# Patient Record
Sex: FEMALE | Race: WHITE
[De-identification: names, ages, dates, MRNs, and addresses within clinical notes are randomized per-mention and may not be internally consistent; named-entity substitution may affect disease eponyms.]

---

## 2020-02-20 ENCOUNTER — HOSPITAL ENCOUNTER (OUTPATIENT)
Dept: HOSPITAL 19 - LDRO | Age: 30
Discharge: HOME | End: 2020-02-20
Payer: OTHER GOVERNMENT

## 2020-02-20 VITALS — HEIGHT: 64.02 IN | WEIGHT: 145.28 LBS | BODY MASS INDEX: 24.8 KG/M2

## 2020-02-20 VITALS — DIASTOLIC BLOOD PRESSURE: 80 MMHG | SYSTOLIC BLOOD PRESSURE: 120 MMHG | HEART RATE: 107 BPM | TEMPERATURE: 98.6 F

## 2020-02-20 VITALS — SYSTOLIC BLOOD PRESSURE: 120 MMHG | TEMPERATURE: 98.6 F | DIASTOLIC BLOOD PRESSURE: 80 MMHG | HEART RATE: 107 BPM

## 2020-02-20 DIAGNOSIS — O26.893: ICD-10-CM

## 2020-02-20 DIAGNOSIS — Z3A.37: ICD-10-CM

## 2020-02-20 DIAGNOSIS — R25.2: ICD-10-CM

## 2020-02-20 NOTE — NUR
SVE unchanged. Reactive, cat 1 FHR strip noted. Patient taken off EFM and
given discharge orders. Reviewed labor precautions and kick counts, patient
denies questions. leaves ambulatory at 1640.

## 2020-02-20 NOTE — NUR
Patient arrives ambulatory with spouse with complaints of irregular cramping
and contractions since 0300 this morning. Patient denies ROM or vaginal
bleeding and reports normal fetal movement.
Changes into gown, EFM explained and placed. VS obtained.
 
1533- SVE by ES De Santiago RN 1/50/-3, unchanged from last office exam. Patient
repositioned and updated on plan of care. Assessment completed.
 
1550- Dr. Goodpasture notified via office RN by ES De Santiago RN. Reviewed
patient complaint, reviewed history and assessment. Reviewed SVE, FHR strip
and contraction pattern. Orders to monitor over one hour and discharge home if
unchanged at that time.

## 2020-03-04 ENCOUNTER — HOSPITAL ENCOUNTER (INPATIENT)
Dept: HOSPITAL 19 - LDR | Age: 30
LOS: 2 days | Discharge: HOME | End: 2020-03-06
Payer: OTHER GOVERNMENT

## 2020-03-04 VITALS — BODY MASS INDEX: 25.33 KG/M2 | HEIGHT: 64.02 IN | WEIGHT: 148.37 LBS

## 2020-03-04 DIAGNOSIS — Z3A.39: ICD-10-CM

## 2020-03-05 VITALS — DIASTOLIC BLOOD PRESSURE: 69 MMHG | SYSTOLIC BLOOD PRESSURE: 111 MMHG | TEMPERATURE: 97.9 F | HEART RATE: 112 BPM

## 2020-03-05 VITALS — SYSTOLIC BLOOD PRESSURE: 105 MMHG | DIASTOLIC BLOOD PRESSURE: 57 MMHG | HEART RATE: 96 BPM

## 2020-03-05 VITALS — SYSTOLIC BLOOD PRESSURE: 110 MMHG | DIASTOLIC BLOOD PRESSURE: 79 MMHG | HEART RATE: 104 BPM

## 2020-03-05 VITALS — SYSTOLIC BLOOD PRESSURE: 116 MMHG | DIASTOLIC BLOOD PRESSURE: 75 MMHG | HEART RATE: 105 BPM | TEMPERATURE: 98.9 F

## 2020-03-05 VITALS — SYSTOLIC BLOOD PRESSURE: 114 MMHG | HEART RATE: 106 BPM | DIASTOLIC BLOOD PRESSURE: 58 MMHG

## 2020-03-05 VITALS — HEART RATE: 106 BPM | SYSTOLIC BLOOD PRESSURE: 112 MMHG | DIASTOLIC BLOOD PRESSURE: 69 MMHG

## 2020-03-05 VITALS — DIASTOLIC BLOOD PRESSURE: 60 MMHG | HEART RATE: 95 BPM | SYSTOLIC BLOOD PRESSURE: 102 MMHG

## 2020-03-05 VITALS — SYSTOLIC BLOOD PRESSURE: 110 MMHG | DIASTOLIC BLOOD PRESSURE: 67 MMHG | HEART RATE: 63 BPM

## 2020-03-05 VITALS — DIASTOLIC BLOOD PRESSURE: 64 MMHG | HEART RATE: 99 BPM | SYSTOLIC BLOOD PRESSURE: 108 MMHG

## 2020-03-05 VITALS — HEART RATE: 88 BPM | SYSTOLIC BLOOD PRESSURE: 109 MMHG | DIASTOLIC BLOOD PRESSURE: 61 MMHG

## 2020-03-05 VITALS — HEART RATE: 102 BPM | SYSTOLIC BLOOD PRESSURE: 103 MMHG | DIASTOLIC BLOOD PRESSURE: 76 MMHG

## 2020-03-05 VITALS — SYSTOLIC BLOOD PRESSURE: 110 MMHG | HEART RATE: 104 BPM | DIASTOLIC BLOOD PRESSURE: 79 MMHG

## 2020-03-05 VITALS — SYSTOLIC BLOOD PRESSURE: 109 MMHG | HEART RATE: 93 BPM | DIASTOLIC BLOOD PRESSURE: 72 MMHG

## 2020-03-05 VITALS — TEMPERATURE: 98.3 F

## 2020-03-05 VITALS — SYSTOLIC BLOOD PRESSURE: 112 MMHG | HEART RATE: 113 BPM | DIASTOLIC BLOOD PRESSURE: 62 MMHG

## 2020-03-05 VITALS — DIASTOLIC BLOOD PRESSURE: 64 MMHG | SYSTOLIC BLOOD PRESSURE: 109 MMHG | HEART RATE: 105 BPM

## 2020-03-05 VITALS — HEART RATE: 100 BPM | DIASTOLIC BLOOD PRESSURE: 65 MMHG | SYSTOLIC BLOOD PRESSURE: 105 MMHG

## 2020-03-05 VITALS — DIASTOLIC BLOOD PRESSURE: 59 MMHG | HEART RATE: 95 BPM | SYSTOLIC BLOOD PRESSURE: 104 MMHG

## 2020-03-05 VITALS — HEART RATE: 95 BPM | DIASTOLIC BLOOD PRESSURE: 64 MMHG | SYSTOLIC BLOOD PRESSURE: 110 MMHG

## 2020-03-05 VITALS — SYSTOLIC BLOOD PRESSURE: 109 MMHG | DIASTOLIC BLOOD PRESSURE: 64 MMHG | HEART RATE: 105 BPM

## 2020-03-05 VITALS — DIASTOLIC BLOOD PRESSURE: 65 MMHG | SYSTOLIC BLOOD PRESSURE: 107 MMHG | HEART RATE: 112 BPM

## 2020-03-05 VITALS — SYSTOLIC BLOOD PRESSURE: 100 MMHG | HEART RATE: 105 BPM | TEMPERATURE: 97.8 F | DIASTOLIC BLOOD PRESSURE: 62 MMHG

## 2020-03-05 VITALS — TEMPERATURE: 99 F

## 2020-03-05 VITALS — HEART RATE: 105 BPM | DIASTOLIC BLOOD PRESSURE: 71 MMHG | SYSTOLIC BLOOD PRESSURE: 110 MMHG

## 2020-03-05 VITALS — HEART RATE: 110 BPM | DIASTOLIC BLOOD PRESSURE: 64 MMHG | SYSTOLIC BLOOD PRESSURE: 116 MMHG

## 2020-03-05 VITALS — HEART RATE: 94 BPM | SYSTOLIC BLOOD PRESSURE: 112 MMHG | DIASTOLIC BLOOD PRESSURE: 60 MMHG

## 2020-03-05 VITALS — HEART RATE: 93 BPM | DIASTOLIC BLOOD PRESSURE: 61 MMHG | SYSTOLIC BLOOD PRESSURE: 115 MMHG

## 2020-03-05 VITALS — SYSTOLIC BLOOD PRESSURE: 117 MMHG | DIASTOLIC BLOOD PRESSURE: 71 MMHG | HEART RATE: 109 BPM

## 2020-03-05 VITALS — SYSTOLIC BLOOD PRESSURE: 112 MMHG | DIASTOLIC BLOOD PRESSURE: 72 MMHG | HEART RATE: 114 BPM

## 2020-03-05 VITALS — HEART RATE: 71 BPM | DIASTOLIC BLOOD PRESSURE: 62 MMHG | SYSTOLIC BLOOD PRESSURE: 102 MMHG | TEMPERATURE: 98.4 F

## 2020-03-05 VITALS — SYSTOLIC BLOOD PRESSURE: 106 MMHG | HEART RATE: 108 BPM | DIASTOLIC BLOOD PRESSURE: 65 MMHG

## 2020-03-05 VITALS — DIASTOLIC BLOOD PRESSURE: 61 MMHG | SYSTOLIC BLOOD PRESSURE: 106 MMHG | HEART RATE: 103 BPM

## 2020-03-05 VITALS — TEMPERATURE: 98.2 F | HEART RATE: 112 BPM | SYSTOLIC BLOOD PRESSURE: 103 MMHG | DIASTOLIC BLOOD PRESSURE: 67 MMHG

## 2020-03-05 VITALS — DIASTOLIC BLOOD PRESSURE: 72 MMHG | HEART RATE: 108 BPM | SYSTOLIC BLOOD PRESSURE: 111 MMHG

## 2020-03-05 VITALS — HEART RATE: 96 BPM | DIASTOLIC BLOOD PRESSURE: 64 MMHG | SYSTOLIC BLOOD PRESSURE: 104 MMHG

## 2020-03-05 VITALS — SYSTOLIC BLOOD PRESSURE: 111 MMHG | HEART RATE: 114 BPM | DIASTOLIC BLOOD PRESSURE: 75 MMHG

## 2020-03-05 VITALS — SYSTOLIC BLOOD PRESSURE: 124 MMHG | DIASTOLIC BLOOD PRESSURE: 67 MMHG | HEART RATE: 100 BPM

## 2020-03-05 VITALS — SYSTOLIC BLOOD PRESSURE: 108 MMHG | DIASTOLIC BLOOD PRESSURE: 65 MMHG | HEART RATE: 89 BPM

## 2020-03-05 VITALS — SYSTOLIC BLOOD PRESSURE: 109 MMHG | HEART RATE: 102 BPM | DIASTOLIC BLOOD PRESSURE: 64 MMHG

## 2020-03-05 VITALS — HEART RATE: 97 BPM | SYSTOLIC BLOOD PRESSURE: 118 MMHG | DIASTOLIC BLOOD PRESSURE: 72 MMHG

## 2020-03-05 LAB
BASOPHILS # BLD: 0.1 10*3/UL (ref 0–0.2)
BASOPHILS NFR BLD AUTO: 0.4 % (ref 0–2)
EOSINOPHIL # BLD: 0.2 10*3/UL (ref 0–0.7)
EOSINOPHIL NFR BLD: 1.2 % (ref 0–4)
ERYTHROCYTE [DISTWIDTH] IN BLOOD BY AUTOMATED COUNT: 14 % (ref 11.5–14.5)
GRANULOCYTES # BLD AUTO: 68.6 % (ref 42.2–75.2)
HCT VFR BLD AUTO: 33.8 % (ref 37–47)
HGB BLD-MCNC: 11 G/DL (ref 12.5–16)
LYMPHOCYTES # BLD: 3 10*3/UL (ref 1.2–3.4)
LYMPHOCYTES NFR BLD: 21.5 % (ref 20–51)
MCH RBC QN AUTO: 28 PG (ref 27–31)
MCHC RBC AUTO-ENTMCNC: 33 G/DL (ref 33–37)
MCV RBC AUTO: 85 FL (ref 80–100)
MONOCYTES # BLD: 1 10*3/UL (ref 0.1–0.6)
MONOCYTES NFR BLD AUTO: 7.1 % (ref 1.7–9.3)
NEUTROPHILS # BLD: 9.4 10*3/UL (ref 1.4–6.5)
PLATELET # BLD AUTO: 196 K/MM3 (ref 130–400)
PMV BLD AUTO: 12.3 FL (ref 7.4–10.4)
RBC # BLD AUTO: 3.96 M/MM3 (ref 4.1–5.3)

## 2020-03-05 PROCEDURE — 0KQM0ZZ REPAIR PERINEUM MUSCLE, OPEN APPROACH: ICD-10-PCS

## 2020-03-05 PROCEDURE — 10907ZC DRAINAGE OF AMNIOTIC FLUID, THERAPEUTIC FROM PRODUCTS OF CONCEPTION, VIA NATURAL OR ARTIFICIAL OPENING: ICD-10-PCS

## 2020-03-05 PROCEDURE — 3E033VJ INTRODUCTION OF OTHER HORMONE INTO PERIPHERAL VEIN, PERCUTANEOUS APPROACH: ICD-10-PCS

## 2020-03-05 NOTE — NUR
1500: Dr.Goodpasture at bedside. Amelia nuñez'sarai.
1503: SVE Complete/+2.
1507: Initial push.
1514: Spontaneous vaginal delivery of viable female infant over 2nd degree
laceration assisted by Dr.Goodpasture. Infant care assumed by Monster MCGHEE
at this time. Pitocin off.
1519: Spontaneous vaginal delivery of placenta assisted by Dr.Goodpasture.
Pitocin infusing at 333ml/hr per protocol. Fundus firm, scant lochia noted.
2nd degree perineal laceration repaired by Dr.Goodpasture using 2-0 Chromic on
CT-1.
1525: Red sully catheter used to drain bladder by Dr.Goodpasture. Recovery
period started. Fundus firm, lochia WNL.

## 2020-03-05 NOTE — NUR
Patient calls out reporting increased pressure and mild pain. SVE 7-8/90/0.
Patient repositioned to "kelly position". Patient pushes epidural button.
Instructed to notify RN of increased rectal pressure or urge to push.

## 2020-03-05 NOTE — NUR
Dr.Goodpasture to bedside. Plan of care discussed. Questions answered.
AROM at 0856, small amount of clear fluid noted. SVE 3/50/-3.

## 2020-03-05 NOTE — NUR
Patient unable to lift and hold right leg. Straight catheter used to drain
bladder, 700ml clear yellow urine noted. Pericare performed. Gown changed.
Underwear and ice pack in place. Patient to Room 215 via wheelchair,
transferred with RN assist x2. Patient oriented to room and plan of care.
 and infant at bedside. Call light within reach.

## 2020-03-05 NOTE — NUR
SVE 5/80/-2 per this RN. Patient repositioned to left tilt with peanut ball in
place.  and father at bedside. Call light within reach.

## 2020-03-05 NOTE — NUR
Patient ambulatory onto unit with  at side for scheduled induction of
labor. Patient oriented to room, changes into gown, plan of care discussed.
 
, 39.1wks gestation. GBS-. Patient denies pregnancy complications. Reports
good fetal movement and irregular tightening. Patient reports mild spotting
from SVE yesterday in office, but denies vaginal bleeding or leaking of fluid.
 
EFMs on, VS taken. IV attempted x1 in left hand. IV started in right hand,
labs drawn from IV start, LR infusing. Pitocin started per orders. Assessment
completed. Consents signed. Questions answered.

## 2020-03-05 NOTE — NUR
Patient comfortable with epidural. Cisneros catheter placed. SVE 5/75/-2. Patient
repositioned to right tilt with peanut ball in place. Denies needs.

## 2020-03-05 NOTE — NUR
Patient sleeping upon RN entry to room. Patient repositioned to right tilt
with peanut ball in place. Encouraged to continue resting. Call light within
reach.

## 2020-03-05 NOTE — NUR
0945: Patient sitting up for epidural placement. Thi CONNOLLY at bedside.
0955: Lidocaine.
1000: Epidural Catheter.
1001: Test Dose given by Thi CONNOLLY, no adverse reactions noted.
1005: Patient repositioned to left tilt.

## 2020-03-05 NOTE — NUR
Patient resting upon RN entry to room. SVE 7/90/0, bloody show noted on exam
glove. Patient reports feeling pressure with contractions. Patient
repositioned to right tilt with peanut ball in place, head of bed elevated.
 and father remain at bedside.

## 2020-03-06 VITALS — HEART RATE: 90 BPM | DIASTOLIC BLOOD PRESSURE: 72 MMHG | SYSTOLIC BLOOD PRESSURE: 116 MMHG | TEMPERATURE: 97.7 F

## 2020-03-06 VITALS — DIASTOLIC BLOOD PRESSURE: 66 MMHG | TEMPERATURE: 98.3 F | SYSTOLIC BLOOD PRESSURE: 111 MMHG | HEART RATE: 76 BPM

## 2020-03-06 VITALS — SYSTOLIC BLOOD PRESSURE: 126 MMHG | TEMPERATURE: 98.2 F | HEART RATE: 96 BPM | DIASTOLIC BLOOD PRESSURE: 80 MMHG

## 2020-03-06 NOTE — NUR
Initial visit; Mom thanked  for looking in on her and offering
congratulations and God's blessings for the birth of her daughter. 
thanked family for choosing [place] [place].

## 2020-11-10 ENCOUNTER — HOSPITAL ENCOUNTER (OUTPATIENT)
Dept: HOSPITAL 19 - SDCO | Age: 30
Setting detail: OBSERVATION
LOS: 1 days | Discharge: HOME | End: 2020-11-11
Attending: SURGERY | Admitting: SURGERY
Payer: OTHER GOVERNMENT

## 2020-11-10 ENCOUNTER — HOSPITAL ENCOUNTER (OUTPATIENT)
Dept: HOSPITAL 19 - COL.RAD | Age: 30
End: 2020-11-10
Attending: FAMILY MEDICINE
Payer: OTHER GOVERNMENT

## 2020-11-10 VITALS — TEMPERATURE: 98.2 F | SYSTOLIC BLOOD PRESSURE: 114 MMHG | HEART RATE: 87 BPM | DIASTOLIC BLOOD PRESSURE: 83 MMHG

## 2020-11-10 VITALS — HEART RATE: 93 BPM | DIASTOLIC BLOOD PRESSURE: 71 MMHG | SYSTOLIC BLOOD PRESSURE: 111 MMHG

## 2020-11-10 VITALS — SYSTOLIC BLOOD PRESSURE: 122 MMHG | DIASTOLIC BLOOD PRESSURE: 85 MMHG | TEMPERATURE: 97.9 F | HEART RATE: 88 BPM

## 2020-11-10 VITALS — WEIGHT: 127.43 LBS | HEIGHT: 64.02 IN | BODY MASS INDEX: 21.75 KG/M2

## 2020-11-10 VITALS — HEART RATE: 84 BPM | SYSTOLIC BLOOD PRESSURE: 112 MMHG | DIASTOLIC BLOOD PRESSURE: 62 MMHG

## 2020-11-10 VITALS — HEART RATE: 94 BPM | DIASTOLIC BLOOD PRESSURE: 76 MMHG | SYSTOLIC BLOOD PRESSURE: 111 MMHG

## 2020-11-10 VITALS — DIASTOLIC BLOOD PRESSURE: 87 MMHG | SYSTOLIC BLOOD PRESSURE: 123 MMHG | HEART RATE: 88 BPM

## 2020-11-10 VITALS — SYSTOLIC BLOOD PRESSURE: 118 MMHG | DIASTOLIC BLOOD PRESSURE: 72 MMHG | HEART RATE: 93 BPM

## 2020-11-10 VITALS — SYSTOLIC BLOOD PRESSURE: 118 MMHG | HEART RATE: 96 BPM | DIASTOLIC BLOOD PRESSURE: 83 MMHG

## 2020-11-10 DIAGNOSIS — Z88.8: ICD-10-CM

## 2020-11-10 DIAGNOSIS — G43.909: ICD-10-CM

## 2020-11-10 DIAGNOSIS — D72.829: ICD-10-CM

## 2020-11-10 DIAGNOSIS — F41.9: ICD-10-CM

## 2020-11-10 DIAGNOSIS — K80.20: Primary | ICD-10-CM

## 2020-11-10 DIAGNOSIS — K80.10: Primary | ICD-10-CM

## 2020-11-10 PROCEDURE — G0378 HOSPITAL OBSERVATION PER HR: HCPCS

## 2020-11-10 NOTE — NUR
Patient up to the bathroom with one assist. Voids 600cc without difficulty.
Tolerating crackers and water well.

## 2020-11-10 NOTE — NUR
Patient to room 221 via bed from PACU. She is alert but sleepy. Recovery VS
started, VSS. Pain 7/10. LR infusing per orders to RAC without difficulty.
SCDs on bilaterally. 5 incision sites to abdomen with glue CDI. Plan of care
reviewed with patient and spouse. Call light within reach.

## 2020-11-11 VITALS — DIASTOLIC BLOOD PRESSURE: 73 MMHG | SYSTOLIC BLOOD PRESSURE: 108 MMHG | HEART RATE: 87 BPM | TEMPERATURE: 98.6 F

## 2020-11-11 VITALS — DIASTOLIC BLOOD PRESSURE: 64 MMHG | SYSTOLIC BLOOD PRESSURE: 103 MMHG | HEART RATE: 89 BPM | TEMPERATURE: 98.3 F

## 2020-11-11 VITALS — TEMPERATURE: 98.2 F | DIASTOLIC BLOOD PRESSURE: 69 MMHG | HEART RATE: 70 BPM | SYSTOLIC BLOOD PRESSURE: 102 MMHG

## 2020-11-11 NOTE — NUR
Patient given discharge instructions and reviewed plan of care for follow up.
Denies questions. IV Dcd.